# Patient Record
Sex: MALE | Race: BLACK OR AFRICAN AMERICAN | NOT HISPANIC OR LATINO | Employment: OTHER | URBAN - METROPOLITAN AREA
[De-identification: names, ages, dates, MRNs, and addresses within clinical notes are randomized per-mention and may not be internally consistent; named-entity substitution may affect disease eponyms.]

---

## 2017-02-22 ENCOUNTER — HOSPITAL ENCOUNTER (EMERGENCY)
Facility: CLINIC | Age: 22
Discharge: HOME | End: 2017-02-22
Attending: EMERGENCY MEDICINE | Admitting: EMERGENCY MEDICINE
Payer: MEDICAID

## 2017-02-22 VITALS
HEART RATE: 67 BPM | TEMPERATURE: 98.1 F | SYSTOLIC BLOOD PRESSURE: 116 MMHG | BODY MASS INDEX: 27.09 KG/M2 | RESPIRATION RATE: 22 BRPM | DIASTOLIC BLOOD PRESSURE: 78 MMHG | HEIGHT: 72 IN | OXYGEN SATURATION: 100 % | WEIGHT: 200 LBS

## 2017-02-22 DIAGNOSIS — M25.50 ARTHRALGIA, UNSPECIFIED JOINT: ICD-10-CM

## 2017-02-22 DIAGNOSIS — R00.2 PALPITATIONS: ICD-10-CM

## 2017-02-22 DIAGNOSIS — R20.2 PARESTHESIA: ICD-10-CM

## 2017-02-22 LAB
ANION GAP SERPL CALCULATED.3IONS-SCNC: 8 MMOL/L (ref 3–14)
BASOPHILS # BLD AUTO: 0 10E9/L (ref 0–0.2)
BASOPHILS NFR BLD AUTO: 0.4 %
BUN SERPL-MCNC: 12 MG/DL (ref 7–30)
CALCIUM SERPL-MCNC: 8.6 MG/DL (ref 8.5–10.1)
CHLORIDE SERPL-SCNC: 107 MMOL/L (ref 94–109)
CO2 SERPL-SCNC: 25 MMOL/L (ref 20–32)
CREAT SERPL-MCNC: 0.74 MG/DL (ref 0.66–1.25)
DIFFERENTIAL METHOD BLD: NORMAL
EOSINOPHIL # BLD AUTO: 0.3 10E9/L (ref 0–0.7)
EOSINOPHIL NFR BLD AUTO: 5.7 %
ERYTHROCYTE [DISTWIDTH] IN BLOOD BY AUTOMATED COUNT: 13 % (ref 10–15)
GFR SERPL CREATININE-BSD FRML MDRD: ABNORMAL ML/MIN/1.7M2
GLUCOSE SERPL-MCNC: 124 MG/DL (ref 70–99)
HCT VFR BLD AUTO: 43.5 % (ref 40–53)
HGB BLD-MCNC: 15.2 G/DL (ref 13.3–17.7)
IMM GRANULOCYTES # BLD: 0 10E9/L (ref 0–0.4)
IMM GRANULOCYTES NFR BLD: 0.6 %
INTERPRETATION ECG - MUSE: NORMAL
LYMPHOCYTES # BLD AUTO: 2.6 10E9/L (ref 0.8–5.3)
LYMPHOCYTES NFR BLD AUTO: 47.2 %
MAGNESIUM SERPL-MCNC: 2.2 MG/DL (ref 1.6–2.3)
MCH RBC QN AUTO: 29.3 PG (ref 26.5–33)
MCHC RBC AUTO-ENTMCNC: 34.9 G/DL (ref 31.5–36.5)
MCV RBC AUTO: 84 FL (ref 78–100)
MONOCYTES # BLD AUTO: 0.4 10E9/L (ref 0–1.3)
MONOCYTES NFR BLD AUTO: 7 %
NEUTROPHILS # BLD AUTO: 2.1 10E9/L (ref 1.6–8.3)
NEUTROPHILS NFR BLD AUTO: 39.1 %
NRBC # BLD AUTO: 0 10*3/UL
NRBC BLD AUTO-RTO: 0 /100
PLATELET # BLD AUTO: 186 10E9/L (ref 150–450)
POTASSIUM SERPL-SCNC: 3.6 MMOL/L (ref 3.4–5.3)
RBC # BLD AUTO: 5.19 10E12/L (ref 4.4–5.9)
SODIUM SERPL-SCNC: 140 MMOL/L (ref 133–144)
WBC # BLD AUTO: 5.4 10E9/L (ref 4–11)

## 2017-02-22 PROCEDURE — 99284 EMERGENCY DEPT VISIT MOD MDM: CPT | Mod: 25 | Performed by: EMERGENCY MEDICINE

## 2017-02-22 PROCEDURE — 85025 COMPLETE CBC W/AUTO DIFF WBC: CPT | Performed by: EMERGENCY MEDICINE

## 2017-02-22 PROCEDURE — 93010 ELECTROCARDIOGRAM REPORT: CPT | Mod: Z6 | Performed by: EMERGENCY MEDICINE

## 2017-02-22 PROCEDURE — 83735 ASSAY OF MAGNESIUM: CPT | Performed by: EMERGENCY MEDICINE

## 2017-02-22 PROCEDURE — 93005 ELECTROCARDIOGRAM TRACING: CPT | Performed by: EMERGENCY MEDICINE

## 2017-02-22 PROCEDURE — 80048 BASIC METABOLIC PNL TOTAL CA: CPT | Performed by: EMERGENCY MEDICINE

## 2017-02-22 RX ORDER — LIDOCAINE 40 MG/G
CREAM TOPICAL
Status: DISCONTINUED | OUTPATIENT
Start: 2017-02-22 | End: 2017-02-22 | Stop reason: HOSPADM

## 2017-02-22 ASSESSMENT — ENCOUNTER SYMPTOMS
CONFUSION: 0
EYE REDNESS: 0
NUMBNESS: 1
HEADACHES: 0
DIFFICULTY URINATING: 0
SHORTNESS OF BREATH: 0
NECK STIFFNESS: 0
COLOR CHANGE: 0
PALPITATIONS: 1
ABDOMINAL PAIN: 0
FEVER: 0
ARTHRALGIAS: 0

## 2017-02-22 NOTE — DISCHARGE INSTRUCTIONS
"  Arthralgia    Arthralgia is the term for pain in or around the joint. It is a symptom, not a disease. This pain may involve one or more joints. In some cases, the pain moves from joint to joint.  There are many causes for joint pain. These include:    Injury    Osteoarthritis (wearing out of the joint surface)    Gout (inflammation of the joint due to crystals in the joint fluid)    Infection inside the joint      Bursitis (inflammation of the fluid-filled sacs around the joint)    Autoimmune disorders such as rheumatoid arthritis or lupus    Tendonitis (inflamation of chords that attach muscle to bone)  Home care    Rest the involved joint(s) until your symptoms improve.     You may be prescribed pain medication. If none is prescribed, you may use acetaminophen or ibuprofen to control pain and inflammation.  Follow up  Follow up with your healthcare provider or our staff as advised.  When to seek medical care  Contact your healthcare provider right away if any of the following occurs:    Pain, swelling, or redness of joint increases    Pain worsens or recurs after a period of improvement    Pain moves to other joints    You cannot bear weight on the affected joint     You cannot move the affected joint    Joint appears deformed    New rash appears    Fever of 101 F (38.8 C) or higher, or as directed by your healthcare provider    1898-6520 [employer]. [address] [place] [address], [place] [address]. All rights reserved. This information is not intended as a substitute for professional medical care. Always follow your healthcare professional's instructions.            * Heart Palpitations    Palpitations refers to the feeling that your heart is beating hard, fast or irregular. Some people describe it as \"pounding\" or \"skipped beats\". Palpitations may occur in persons with heart disease, but can also occur in healthy persons. Your doctor does not believe that anything dangerous is causing your symptoms at " "this time.  Heart-Related Causes:    Arrhythmia (a change from the heart's normal rhythm)    Disease of the heart valves  Non-Heart-Related Causes:    Certain medicines (such as asthma inhalers and decongestants)    Some herbal supplements, energy drinks and pills, and weight loss pills    Illegal stimulant drugs (such as cocaine, crank, methamphetamine, PCP)    Caffeine, alcohol and tobacco    Medical conditions such as thyroid disease, anemia, anxiety and panic disorder  Sometimes the cause cannot be found.  Home Care:  1. Avoid excess caffeine, alcohol, tobacco and any stimulant drugs.  2. Tell your doctor about any prescription or over-the-counter or herbal medicines you take.  Follow Up  with your doctor or as advised by our staff.  Get Prompt Medical Attention  if any of the following occur together with palpitations:    Weakness, dizziness, light-headed or fainting    Chest pain or shortness of breath    Rapid heart rate (over 120 beats per minute, at rest)    Palpitations that lasts over 20 minutes    Weakness of an arm or leg or one side of the face    Difficulty with speech or vision    8659-1326 [employer]. [address] [place] [address]. All rights reserved. This information is not intended as a substitute for professional medical care. Always follow your healthcare professional's instructions.          Paraesthesias  Paraesthesia refers to a burning or prickling sensation that is sometimes felt in the hands, arms, legs or feet. It can also occur in other parts of the body. It can also feel like tingling or numbness, skin crawling, or itching. The feeling is not comfortable, but it is not painful. (The \"pins and needles\" feeling that happens when a foot or hand \"falls asleep\" is a temporary paraesthesia.)  Paraesthesias that last or come and go may be caused by medical issues that need to be treated. These include stroke, bulging disk (pressing on a nerve), a trapped nerve), " vitamin deficiencies, or even certain medicines.  Tests are often done. These tests may include blood tests, X-ray, CT (computerized tomography) scan, or a muscle test (electromyography). Depending on the cause, treatment may include physical therapy.  Home care    Tell the healthcare provider about all medicines you take. This includes prescription and over-the-counter medicines, vitamins, and herbs. Ask if any of the medicines may be causing your problems. Do not make any changes to prescription medicines without talking to your healthcare provider first.    You may be prescribed medicines to help relieve the tingling feeling or for pain. Take all medicines as directed.    A numb hand or foot may be more prone to injury. To help protect it:    Always use oven mitts.    Test water with an unaffected hand or foot.    Use caution when trimming nails. File sharp areas.    Wear shoes that fit well to avoid pressure points, blisters, and ulcers.    Inspect your hands and feet carefully (including the soles of your feet and between your toes) at least once a week. If you see red areas, sores, or other problems, tell your healthcare provider.  Follow-up care  Follow up with your doctor or as advised by our staff. You may need further testing or evaluation.  When to seek medical advice  Call your healthcare provider right away if any of the following occur:    Numbness or weakness of the face, one arm, or one leg    Slurred speech, confusion, trouble speaking, walking, or seeing    Severe headache, fainting spell, dizziness, or seizure    Chest, arm, neck, or upper back pain    Loss of bladder or bowel control    Open wound with redness, swelling, or pus    7139-2872 [employer]. [address] [place] [address], [place] [address]. All rights reserved. This information is not intended as a substitute for professional medical care. Always follow your healthcare professional's instructions.    Please make an appointment  to follow up with [[facility] (phone: [phone])] [as soon as possible] [ even if entirely better.]

## 2017-02-22 NOTE — LETTER
[facility], [place], EMERGENCY DEPARTMENT  [address] [facility] [address]  [phone]    2017    Van Degroot  [address]  [facility] [address]  [phone] (home)     : 1995      To Whom it may concern:    Van Degroot was seen in our Emergency Department today, 2017.   He may return to work.    Sincerely,        Amol Chen

## 2017-02-22 NOTE — ED AVS SNAPSHOT
[facility], Emergency Department    [address] [facility] [address]    Phone:  [phone]                                       Van Degroot   MRN: [id number]    Department:  [facility], Emergency Department   Date of Visit:  2/22/2017           Patient Information     Date Of Birth          1995        Your diagnoses for this visit were:     Paresthesia     Palpitations     Arthralgia, unspecified joint        You were seen by Amol Chen MD.        Discharge Instructions         Arthralgia    Arthralgia is the term for pain in or around the joint. It is a symptom, not a disease. This pain may involve one or more joints. In some cases, the pain moves from joint to joint.  There are many causes for joint pain. These include:    Injury    Osteoarthritis (wearing out of the joint surface)    Gout (inflammation of the joint due to crystals in the joint fluid)    Infection inside the joint      Bursitis (inflammation of the fluid-filled sacs around the joint)    Autoimmune disorders such as rheumatoid arthritis or lupus    Tendonitis (inflamation of chords that attach muscle to bone)  Home care    Rest the involved joint(s) until your symptoms improve.     You may be prescribed pain medication. If none is prescribed, you may use acetaminophen or ibuprofen to control pain and inflammation.  Follow up  Follow up with your healthcare provider or our staff as advised.  When to seek medical care  Contact your healthcare provider right away if any of the following occurs:    Pain, swelling, or redness of joint increases    Pain worsens or recurs after a period of improvement    Pain moves to other joints    You cannot bear weight on the affected joint     You cannot move the affected joint    Joint appears deformed    New rash appears    Fever of 101 F (38.8 C) or higher, or as directed by your healthcare provider    4036-2596 [employer]. [address] [place] [address].  "All rights reserved. This information is not intended as a substitute for professional medical care. Always follow your healthcare professional's instructions.            * Heart Palpitations    Palpitations refers to the feeling that your heart is beating hard, fast or irregular. Some people describe it as \"pounding\" or \"skipped beats\". Palpitations may occur in persons with heart disease, but can also occur in healthy persons. Your doctor does not believe that anything dangerous is causing your symptoms at this time.  Heart-Related Causes:    Arrhythmia (a change from the heart's normal rhythm)    Disease of the heart valves  Non-Heart-Related Causes:    Certain medicines (such as asthma inhalers and decongestants)    Some herbal supplements, energy drinks and pills, and weight loss pills    Illegal stimulant drugs (such as cocaine, crank, methamphetamine, PCP)    Caffeine, alcohol and tobacco    Medical conditions such as thyroid disease, anemia, anxiety and panic disorder  Sometimes the cause cannot be found.  Home Care:  1. Avoid excess caffeine, alcohol, tobacco and any stimulant drugs.  2. Tell your doctor about any prescription or over-the-counter or herbal medicines you take.  Follow Up  with your doctor or as advised by our staff.  Get Prompt Medical Attention  if any of the following occur together with palpitations:    Weakness, dizziness, light-headed or fainting    Chest pain or shortness of breath    Rapid heart rate (over 120 beats per minute, at rest)    Palpitations that lasts over 20 minutes    Weakness of an arm or leg or one side of the face    Difficulty with speech or vision    4683-7813 [employer]. [address] [place] [address]. All rights reserved. This information is not intended as a substitute for professional medical care. Always follow your healthcare professional's instructions.          Paraesthesias  Paraesthesia refers to a burning or prickling sensation " "that is sometimes felt in the hands, arms, legs or feet. It can also occur in other parts of the body. It can also feel like tingling or numbness, skin crawling, or itching. The feeling is not comfortable, but it is not painful. (The \"pins and needles\" feeling that happens when a foot or hand \"falls asleep\" is a temporary paraesthesia.)  Paraesthesias that last or come and go may be caused by medical issues that need to be treated. These include stroke, bulging disk (pressing on a nerve), a trapped nerve), vitamin deficiencies, or even certain medicines.  Tests are often done. These tests may include blood tests, X-ray, CT (computerized tomography) scan, or a muscle test (electromyography). Depending on the cause, treatment may include physical therapy.  Home care    Tell the healthcare provider about all medicines you take. This includes prescription and over-the-counter medicines, vitamins, and herbs. Ask if any of the medicines may be causing your problems. Do not make any changes to prescription medicines without talking to your healthcare provider first.    You may be prescribed medicines to help relieve the tingling feeling or for pain. Take all medicines as directed.    A numb hand or foot may be more prone to injury. To help protect it:    Always use oven mitts.    Test water with an unaffected hand or foot.    Use caution when trimming nails. File sharp areas.    Wear shoes that fit well to avoid pressure points, blisters, and ulcers.    Inspect your hands and feet carefully (including the soles of your feet and between your toes) at least once a week. If you see red areas, sores, or other problems, tell your healthcare provider.  Follow-up care  Follow up with your doctor or as advised by our staff. You may need further testing or evaluation.  When to seek medical advice  Call your healthcare provider right away if any of the following occur:    Numbness or weakness of the face, one arm, or one " leg    Slurred speech, confusion, trouble speaking, walking, or seeing    Severe headache, fainting spell, dizziness, or seizure    Chest, arm, neck, or upper back pain    Loss of bladder or bowel control    Open wound with redness, swelling, or pus    2231-3965 [employer]. [address] [place] [address]. All rights reserved. This information is not intended as a substitute for professional medical care. Always follow your healthcare professional's instructions.    Please make an appointment to follow up with [[facility] (phone: [phone])] [as soon as possible] [ even if entirely better.]        24 Hour Appointment Hotline       To make an appointment at any [facility], call [phone] ([phone]). If you don't have a family doctor or clinic, we will help you find one. [facility] clinics are conveniently located to serve the needs of you and your family.             Review of your medicines      Notice     You have not been prescribed any medications.            Procedures and tests performed during your visit     Basic metabolic panel    CBC with platelets differential    EKG 12-lead, tracing only    Magnesium    Peripheral IV catheter      Orders Needing Specimen Collection     None      Pending Results     Date and Time Order Name Status Description    2/22/2017 0630 EKG 12-lead, tracing only Preliminary             Pending Culture Results     No orders found from 2/20/2017 to 2/23/2017.            Thank you for choosing [facility]       Thank you for choosing [facility] for your care. Our goal is always to provide you with excellent care. Hearing back from our patients is one way we can continue to improve our services. Please take a few minutes to complete the written survey that you may receive in the mail after you visit with us. Thank you!        [employer] Information     [employer] lets you send messages to your doctor, view your test results, renew  "your prescriptions, schedule appointments and more. To sign up, go to [email or web address] . Click on \"Log in\" on the left side of the screen, which will take you to the Welcome page. Then click on \"Sign up Now\" on the right side of the page.     You will be asked to enter the access code listed below, as well as some personal information. Please follow the directions to create your username and password.     Your access code is: [email or web address]  Expires: 2017  8:09 AM     Your access code will  in 90 days. If you need help or a new code, please call your [facility] clinic or [phone].        Care EveryWhere ID     This is your Care EveryWhere ID. This could be used by other organizations to access your [facility] medical records  [id number]        After Visit Summary       This is your record. Keep this with you and show to your community pharmacist(s) and doctor(s) at your next visit.                  "

## 2017-02-22 NOTE — ED NOTES
"23 yo male arrives via triage c/o \"I don't feel well\". Pt describes it as his heart feels heavy and has to taking lyle breaths. Pt is aox4 on arrival and talking in complete sentences. Pt denies any cardiac or pulmonary history and takes no medications on a daily basis.   "

## 2017-02-22 NOTE — ED AVS SNAPSHOT
[facility], [place], Emergency Department    [address] [place] [address]    Phone:  [phone]                                       Van Degroot   MRN: [id number]    Department:  [facility], Emergency Department   Date of Visit:  2/22/2017           After Visit Summary Signature Page     I have received my discharge instructions, and my questions have been answered. I have discussed any challenges I see with this plan with the nurse or doctor.    ..........................................................................................................................................  Patient/Patient Representative Signature      ..........................................................................................................................................  Patient Representative Print Name and Relationship to Patient    ..................................................               ................................................  Date                                            Time    ..........................................................................................................................................  Reviewed by Signature/Title    ...................................................              ..............................................  Date                                                            Time

## 2017-02-22 NOTE — ED PROVIDER NOTES
"  History     Chief Complaint   Patient presents with     Shortness of Breath     Irregular Heart Beat     Dizziness     HPI  Van Degroot is a 22 year old male who presents with a number of complaints including intermittent episodes of bilateral hand and foot paresthesias, palpitations, feeling like he needs to take lyle breaths, joint aches, as well as feeling like he may have an illness.  He states that he is here for  \"a checkup\".  He states that he has had these symptoms off and on dating back years and has seen a physician and was told that he needs to get regular exercise.  He notes that he is most concerned that he may have diabetes.  He apparently had a relative with diabetes with similar symptoms.    I have reviewed the Medications, Allergies, Past Medical and Surgical History, and Social History in the Epic system.    Review of Systems   Constitutional: Negative for fever.   HENT: Negative for congestion.    Eyes: Negative for redness.   Respiratory: Negative for shortness of breath.    Cardiovascular: Positive for palpitations. Negative for chest pain.   Gastrointestinal: Negative for abdominal pain.   Genitourinary: Negative for difficulty urinating.   Musculoskeletal: Negative for arthralgias and neck stiffness.   Skin: Negative for color change.   Neurological: Positive for numbness (intermittent paresthesias of bilateral hands and feet). Negative for headaches.   Psychiatric/Behavioral: Negative for confusion.       Physical Exam   BP: 131/83  Pulse: 67  Temp: 98.1  F (36.7  C)  Resp: 18  Height: 182.9 cm (6')  Weight: 90.7 kg (200 lb)  SpO2: 100 %  Physical Exam   Constitutional: No distress.   HENT:   Head: Atraumatic.   Mouth/Throat: Oropharynx is clear and moist. No oropharyngeal exudate.   Eyes: Pupils are equal, round, and reactive to light. No scleral icterus.   Cardiovascular: Normal heart sounds and intact distal pulses.    Pulmonary/Chest: Breath sounds normal. No respiratory " distress.   Abdominal: Soft. Bowel sounds are normal. There is no tenderness.   Musculoskeletal: He exhibits no edema or tenderness.   Skin: Skin is warm. No rash noted. He is not diaphoretic.       ED Course     ED Course     Procedures             EKG Interpretation:      Interpreted by Amol Chen  Time reviewed: 7 AM  Symptoms at time of EKG: None   Rhythm: normal sinus   Rate: normal  Axis: normal  Ectopy: none  Conduction: normal  ST Segments/ T Waves: No ST-T wave changes  Q Waves: none  Comparison to prior: No old EKG available    Clinical Impression: normal EKG          Critical Care time:  none               Labs Ordered and Resulted from Time of ED Arrival Up to the Time of Departure from the ED   BASIC METABOLIC PANEL - Abnormal; Notable for the following:        Result Value    Glucose 124 (*)     All other components within normal limits   CBC WITH PLATELETS DIFFERENTIAL   MAGNESIUM   PERIPHERAL IV CATHETER       Assessments & Plan (with Medical Decision Making)   22-year-old otherwise healthy male presents with a number of complaints and concerns regarding his overall health.  He is here requesting a medical checkup.  His exam is entirely unremarkable.  EKG is normal and CBC as well as basic metabolic panel are normal with exception of a slightly elevated glucose.  It appears that anxiety is playing a role in some of his symptoms.  I recommended follow-up with the primary care physician to address his complaints going forward.  He was provided with name and number of primary care services.      I have reviewed the nursing notes.    I have reviewed the findings, diagnosis, plan and need for follow up with the patient.    New Prescriptions    No medications on file       Final diagnoses:   Paresthesia   Palpitations   Arthralgia, unspecified joint       2/22/2017   [facility], [place], EMERGENCY DEPARTMENT     Amol Chen MD  02/22/17 0751

## 2018-02-02 ENCOUNTER — OFFICE VISIT - HEALTHEAST (OUTPATIENT)
Dept: FAMILY MEDICINE | Facility: CLINIC | Age: 23
End: 2018-02-02

## 2018-02-02 DIAGNOSIS — G89.29 CHRONIC RIGHT SHOULDER PAIN: ICD-10-CM

## 2018-02-02 DIAGNOSIS — R10.9 ABDOMINAL PAIN: ICD-10-CM

## 2018-02-02 DIAGNOSIS — M25.511 CHRONIC RIGHT SHOULDER PAIN: ICD-10-CM

## 2018-02-02 DIAGNOSIS — M72.2 PLANTAR FASCIITIS OF RIGHT FOOT: ICD-10-CM

## 2018-02-02 LAB
ALBUMIN SERPL-MCNC: 3.9 G/DL (ref 3.5–5)
ALP SERPL-CCNC: 89 U/L (ref 45–120)
ALT SERPL W P-5'-P-CCNC: 49 U/L (ref 0–45)
ANION GAP SERPL CALCULATED.3IONS-SCNC: 9 MMOL/L (ref 5–18)
AST SERPL W P-5'-P-CCNC: 26 U/L (ref 0–40)
BILIRUB SERPL-MCNC: 0.5 MG/DL (ref 0–1)
BUN SERPL-MCNC: 12 MG/DL (ref 8–22)
CALCIUM SERPL-MCNC: 9.1 MG/DL (ref 8.5–10.5)
CHLORIDE BLD-SCNC: 106 MMOL/L (ref 98–107)
CO2 SERPL-SCNC: 25 MMOL/L (ref 22–31)
CREAT SERPL-MCNC: 0.78 MG/DL (ref 0.7–1.3)
ERYTHROCYTE [DISTWIDTH] IN BLOOD BY AUTOMATED COUNT: 13.1 % (ref 11–14.5)
GFR SERPL CREATININE-BSD FRML MDRD: >60 ML/MIN/1.73M2
GLUCOSE BLD-MCNC: 97 MG/DL (ref 70–125)
HCT VFR BLD AUTO: 47 % (ref 40–54)
HGB BLD-MCNC: 15.6 G/DL (ref 14–18)
MCH RBC QN AUTO: 28.3 PG (ref 27–34)
MCHC RBC AUTO-ENTMCNC: 33.2 G/DL (ref 32–36)
MCV RBC AUTO: 85 FL (ref 80–100)
PLATELET # BLD AUTO: 184 THOU/UL (ref 140–440)
PMV BLD AUTO: 9.9 FL (ref 7–10)
POTASSIUM BLD-SCNC: 3.9 MMOL/L (ref 3.5–5)
PROT SERPL-MCNC: 7.8 G/DL (ref 6–8)
RBC # BLD AUTO: 5.51 MILL/UL (ref 4.4–6.2)
SODIUM SERPL-SCNC: 140 MMOL/L (ref 136–145)
WBC: 6.1 THOU/UL (ref 4–11)

## 2018-02-02 ASSESSMENT — MIFFLIN-ST. JEOR: SCORE: 1975.48

## 2018-02-05 ENCOUNTER — COMMUNICATION - HEALTHEAST (OUTPATIENT)
Dept: FAMILY MEDICINE | Facility: CLINIC | Age: 23
End: 2018-02-05

## 2018-05-04 ENCOUNTER — OFFICE VISIT - HEALTHEAST (OUTPATIENT)
Dept: FAMILY MEDICINE | Facility: CLINIC | Age: 23
End: 2018-05-04

## 2018-05-04 ENCOUNTER — RECORDS - HEALTHEAST (OUTPATIENT)
Dept: GENERAL RADIOLOGY | Facility: CLINIC | Age: 23
End: 2018-05-04

## 2018-05-04 DIAGNOSIS — R10.31 RLQ ABDOMINAL PAIN: ICD-10-CM

## 2018-05-04 DIAGNOSIS — Z00.00 ROUTINE HEALTH MAINTENANCE: ICD-10-CM

## 2018-05-04 DIAGNOSIS — Z13.220 SCREENING FOR LIPID DISORDERS: ICD-10-CM

## 2018-05-04 DIAGNOSIS — R10.31 RIGHT LOWER QUADRANT PAIN: ICD-10-CM

## 2018-05-04 LAB
CHOLEST SERPL-MCNC: 157 MG/DL
FASTING STATUS PATIENT QL REPORTED: YES
HDLC SERPL-MCNC: 42 MG/DL
LDLC SERPL CALC-MCNC: 82 MG/DL
TRIGL SERPL-MCNC: 167 MG/DL

## 2018-05-04 ASSESSMENT — MIFFLIN-ST. JEOR: SCORE: 2001.62

## 2018-05-30 ENCOUNTER — OFFICE VISIT - HEALTHEAST (OUTPATIENT)
Dept: FAMILY MEDICINE | Facility: CLINIC | Age: 23
End: 2018-05-30

## 2018-05-30 DIAGNOSIS — J30.9 ALLERGIC RHINITIS: ICD-10-CM

## 2018-05-30 DIAGNOSIS — R23.3 PALATAL PETECHIAE: ICD-10-CM

## 2018-05-30 DIAGNOSIS — J02.0 STREP PHARYNGITIS: ICD-10-CM

## 2018-05-30 LAB — DEPRECATED S PYO AG THROAT QL EIA: ABNORMAL

## 2018-09-25 ENCOUNTER — OFFICE VISIT - HEALTHEAST (OUTPATIENT)
Dept: FAMILY MEDICINE | Facility: CLINIC | Age: 23
End: 2018-09-25

## 2018-09-25 DIAGNOSIS — R10.31 RLQ ABDOMINAL PAIN: ICD-10-CM

## 2018-09-25 DIAGNOSIS — M79.671 HEEL PAIN, BILATERAL: ICD-10-CM

## 2018-09-25 DIAGNOSIS — M79.672 HEEL PAIN, BILATERAL: ICD-10-CM

## 2018-12-28 ENCOUNTER — OFFICE VISIT - HEALTHEAST (OUTPATIENT)
Dept: FAMILY MEDICINE | Facility: CLINIC | Age: 23
End: 2018-12-28

## 2018-12-28 ENCOUNTER — RECORDS - HEALTHEAST (OUTPATIENT)
Dept: GENERAL RADIOLOGY | Facility: CLINIC | Age: 23
End: 2018-12-28

## 2018-12-28 ENCOUNTER — HOSPITAL ENCOUNTER (OUTPATIENT)
Dept: CT IMAGING | Facility: CLINIC | Age: 23
Discharge: HOME | End: 2018-12-28

## 2018-12-28 DIAGNOSIS — R10.31 RLQ ABDOMINAL PAIN: ICD-10-CM

## 2018-12-28 DIAGNOSIS — R07.89 ATYPICAL CHEST PAIN: ICD-10-CM

## 2018-12-28 DIAGNOSIS — R10.31 RIGHT LOWER QUADRANT PAIN: ICD-10-CM

## 2018-12-29 LAB
ATRIAL RATE - MUSE: 58 BPM
DIASTOLIC BLOOD PRESSURE - MUSE: NORMAL MMHG
INTERPRETATION ECG - MUSE: NORMAL
P AXIS - MUSE: 26 DEGREES
PR INTERVAL - MUSE: 152 MS
QRS DURATION - MUSE: 84 MS
QT - MUSE: 382 MS
QTC - MUSE: 374 MS
R AXIS - MUSE: 14 DEGREES
SYSTOLIC BLOOD PRESSURE - MUSE: NORMAL MMHG
T AXIS - MUSE: 25 DEGREES
VENTRICULAR RATE- MUSE: 58 BPM

## 2019-05-17 ENCOUNTER — OFFICE VISIT - HEALTHEAST (OUTPATIENT)
Dept: FAMILY MEDICINE | Facility: CLINIC | Age: 24
End: 2019-05-17

## 2019-05-17 DIAGNOSIS — M25.562 LEFT KNEE PAIN, UNSPECIFIED CHRONICITY: ICD-10-CM

## 2019-05-17 DIAGNOSIS — J30.2 SEASONAL ALLERGIC RHINITIS, UNSPECIFIED TRIGGER: ICD-10-CM

## 2019-07-08 ENCOUNTER — OFFICE VISIT - HEALTHEAST (OUTPATIENT)
Dept: FAMILY MEDICINE | Facility: CLINIC | Age: 24
End: 2019-07-08

## 2019-07-08 DIAGNOSIS — M25.562 CHRONIC PAIN OF LEFT KNEE: ICD-10-CM

## 2019-07-08 DIAGNOSIS — G89.29 CHRONIC PAIN OF LEFT KNEE: ICD-10-CM

## 2019-10-10 ENCOUNTER — OFFICE VISIT - HEALTHEAST (OUTPATIENT)
Dept: FAMILY MEDICINE | Facility: CLINIC | Age: 24
End: 2019-10-10

## 2019-10-10 ENCOUNTER — HOSPITAL ENCOUNTER (OUTPATIENT)
Dept: RADIOLOGY | Facility: CLINIC | Age: 24
Discharge: HOME | End: 2019-10-10

## 2019-10-10 DIAGNOSIS — E87.6 HYPOKALEMIA: ICD-10-CM

## 2019-10-10 DIAGNOSIS — R06.02 SHORTNESS OF BREATH: ICD-10-CM

## 2019-10-10 DIAGNOSIS — R05.9 COUGH: ICD-10-CM

## 2019-10-10 LAB
ALBUMIN UR-MCNC: NEGATIVE MG/DL
ANION GAP SERPL CALCULATED.3IONS-SCNC: 12 MMOL/L (ref 5–18)
APPEARANCE UR: CLEAR
BASOPHILS # BLD AUTO: 0 THOU/UL (ref 0–0.2)
BASOPHILS NFR BLD AUTO: 0 % (ref 0–2)
BILIRUB UR QL STRIP: NEGATIVE
BUN SERPL-MCNC: 5 MG/DL (ref 8–22)
CHLORIDE BLD-SCNC: 101 MMOL/L (ref 98–107)
CO2 SERPL-SCNC: 26 MMOL/L (ref 22–31)
COLOR UR AUTO: YELLOW
CREAT SERPL-MCNC: 0.7 MG/DL (ref 0.8–1.5)
EOSINOPHIL # BLD AUTO: 0.2 THOU/UL (ref 0–0.4)
EOSINOPHIL NFR BLD AUTO: 3 % (ref 0–6)
ERYTHROCYTE [DISTWIDTH] IN BLOOD BY AUTOMATED COUNT: 13.1 % (ref 11–14.5)
GLUCOSE BLD-MCNC: 139 MG/DL (ref 70–125)
GLUCOSE UR STRIP-MCNC: NEGATIVE MG/DL
HCT VFR BLD AUTO: 44.5 % (ref 40–54)
HGB BLD-MCNC: 15.3 G/DL (ref 14–18)
HGB UR QL STRIP: NEGATIVE
KETONES UR STRIP-MCNC: NEGATIVE MG/DL
LEUKOCYTE ESTERASE UR QL STRIP: NEGATIVE
LYMPHOCYTES # BLD AUTO: 1.8 THOU/UL (ref 0.8–4.4)
LYMPHOCYTES NFR BLD AUTO: 33 % (ref 20–40)
MCH RBC QN AUTO: 29.9 PG (ref 27–34)
MCHC RBC AUTO-ENTMCNC: 34.4 G/DL (ref 32–36)
MCV RBC AUTO: 87 FL (ref 80–100)
MONOCYTES # BLD AUTO: 0.5 THOU/UL (ref 0–0.9)
MONOCYTES NFR BLD AUTO: 9 % (ref 2–10)
NEUTROPHILS # BLD AUTO: 2.9 THOU/UL (ref 2–7.7)
NEUTROPHILS NFR BLD AUTO: 55 % (ref 50–70)
NITRATE UR QL: NEGATIVE
PH UR STRIP: 7 [PH] (ref 5–8)
PLATELET # BLD AUTO: 189 THOU/UL (ref 140–440)
PMV BLD AUTO: 9.6 FL (ref 7–10)
POTASSIUM BLD-SCNC: 3.4 MMOL/L (ref 3.5–5.5)
RBC # BLD AUTO: 5.13 MILL/UL (ref 4.4–6.2)
SODIUM SERPL-SCNC: 139 MMOL/L (ref 136–145)
SP GR UR STRIP: 1.02 (ref 1–1.03)
UROBILINOGEN UR STRIP-ACNC: NORMAL
WBC: 5.3 THOU/UL (ref 4–11)

## 2020-03-26 ENCOUNTER — COMMUNICATION - HEALTHEAST (OUTPATIENT)
Dept: FAMILY MEDICINE | Facility: CLINIC | Age: 25
End: 2020-03-26

## 2021-03-30 ENCOUNTER — COMMUNICATION - HEALTHEAST (OUTPATIENT)
Dept: ADMINISTRATIVE | Facility: CLINIC | Age: 26
End: 2021-03-30

## 2021-03-30 DIAGNOSIS — Z00.00 ROUTINE HEALTH MAINTENANCE: ICD-10-CM

## 2021-05-28 NOTE — PROGRESS NOTES
Assessment/Plan:     1. Seasonal allergic rhinitis, unspecified trigger  Start daily antihistamine.  Zyrtec once daily.  - cetirizine (ZYRTEC) 10 MG tablet; Take 1 tablet (10 mg total) by mouth daily.  Dispense: 30 tablet; Refill: 2    2. Left knee pain, unspecified chronicity  No swelling or instability.  Possible overuse versus meniscal injury.  Recommend rest, ice, Ibuprofen as needed.  Refer to PT.   - Ambulatory referral to Adult PT- Internal        Subjective:     Van Degroot is a 24 y.o. male who presents with complains of a nonproductive cough, runny nose, eye itching, and sneezing for the past 1.5 weeks.  He feels intermittently congested.  No fever, wheezing, sore throat, or shortness of breath.  Does not historically get seasonal allergies.  No OTC treatments tried.    Patient also complains of left knee pain for the last 3 months.  Symptoms have gotten worse.  Reports left, lateral knee pain.  It really only hurts when he stands up after sitting for a long period of time.  No popping, locking, or clicking of the knee.  Denies any redness or swelling.  After he walks for a period of time, the pain will completely resolve.  He plays soccer.  No history of knee surgeries or injuries.  No treatments tried at home other than rest.       The following portions of the patient's history were reviewed and updated as appropriate: allergies, current medications, past family history, past medical history, past social history, past surgical history and problem list.    Review of Systems  A comprehensive review of systems was performed and was otherwise negative    Objective:     /70   Pulse 65   Temp 98.7  F (37.1  C)   Wt 215 lb 3.2 oz (97.6 kg)   SpO2 98%   BMI 28.79 kg/m      General Appearance: Alert, cooperative, no distress, appears stated age  Eyes: PERRL, conjunctiva/corneas clear, EOM's intact  Ears: Normal TM's and external ear canals, both ears  Nose: Nares normal, septum midline, mucosa  normal, no drainage  Throat: Lips, mucosa, and tongue normal; teeth and gums normal  Neck: Supple, symmetrical, trachea midline, no adenopathy  Lungs: Clear to auscultation bilaterally, respirations unlabored  Heart: Regular rate and rhythm, S1 and S2 normal, no murmur, rub, or gallop  Extremities: Left knee appears normal without redness, swelling, or acute deformity. No laxity noted. Normal flexion and extension without clicking or locking. Tenderness palpated over the lateral joint line.     Amalia Ambrosio, NP

## 2021-05-30 NOTE — PROGRESS NOTES
Subjective:   Van Degroot is a(n) 24 y.o. Black or  male who presents to [facility] with the following complaint(s):  Knee Pain (Left knee pain x 3-4 months)    History of Present Illness:  Patient presents with knee pain for 3-4 months. Occurred starting after a fall during soccer. Pain is located on the left lateral knee. He notes the pain when crossing his legs, if he was sitting for a while and stands he has pain for a few steps. He is still able to play soccer, but sometimes stops early. Not swollen. Has not tried a knee sleeve. Did not treat ti in any way when it first occurred.   He also notes he started personal training at the gym.    The following portions of the patient's history were reviewed and updated as appropriate: allergies, current medications, past medical history and problem list.    Review of Systems:   Patient feeling well without fevers, chills, skin rash or changes of the skin over his left knee, swelling in his ankles.  Objective:     Vitals:    07/08/19 1214   BP: 111/73   Patient Site: Right Arm   Patient Position: Sitting   Cuff Size: Adult Large   Pulse: 74   Resp: 18   Temp: 98.4  F (36.9  C)   TempSrc: Oral   SpO2: 99%   Weight: 217 lb (98.4 kg)     General: No acute distress  Skin: Left knee is not erythematous, no lesions  Musculoskeletal: Knees are symmetric to inspection without effusion seen, bilateral lower extremities with 5 out of 5 strength for all movements, patient with mild tenderness to palpation of lateral left knee over the joint line.  Possible very mild swelling of this area compared to the right.  Patient able to bear weight and ambulate without a limp.  Intact passive and active range of motion.  No crepitus felt, no tenderness over the patella.  Reflexes are symmetric at bilateral patellar tendons.  There is no edema of lower externally's bilaterally.    Assessment/Plan   1. Chronic pain of left knee.  Patient fell during soccer 4 months ago  and has had left knee pain ever since.  He has always been able to bear weight and his passive and active range of motion are intact.  There is no major effusion seen, patient has tenderness over the lateral joint line with potential mild soft tissue swelling over the fibular head but looks symmetric. This is likely a traumatic bursitis followed by overuse as he never took a break from soccer and has started personal training at the gym.  We discussed today relative rest and icing of the area to help with pain control as well as over-the-counter pain medications.  I recommend physical therapy for resolution of the pain, and patient was interested in this.  The pain has been going on for 4 months and patient's activities are not hindered I do not see any need for immediate imaging.  I do not suspect septic joint.  - Ambulatory referral to PT/OT  - Counseled  regarding assessment and plan for evaluation and treatment. Questions were answered. See AVS for the specific written instructions and educational handout(s) that were provided at the conclusion of the visit.   - Discussed signs / symptoms that warrant urgent / emergent medical attention.   - Follow up with PCP if symptoms worsen or fail to resolve.    Lizett Vu MD

## 2021-05-31 VITALS — HEIGHT: 72 IN | BODY MASS INDEX: 28.74 KG/M2 | WEIGHT: 212.19 LBS

## 2021-06-01 VITALS — BODY MASS INDEX: 28.76 KG/M2 | WEIGHT: 215 LBS

## 2021-06-01 VITALS — BODY MASS INDEX: 28.65 KG/M2 | HEIGHT: 73 IN | WEIGHT: 216.2 LBS

## 2021-06-02 VITALS — BODY MASS INDEX: 28.49 KG/M2 | WEIGHT: 213 LBS

## 2021-06-02 VITALS — WEIGHT: 216.7 LBS | BODY MASS INDEX: 28.99 KG/M2

## 2021-06-03 VITALS — BODY MASS INDEX: 29.03 KG/M2 | WEIGHT: 217 LBS

## 2021-06-03 VITALS
RESPIRATION RATE: 18 BRPM | BODY MASS INDEX: 28.93 KG/M2 | DIASTOLIC BLOOD PRESSURE: 74 MMHG | WEIGHT: 216.3 LBS | OXYGEN SATURATION: 100 % | HEART RATE: 76 BPM | SYSTOLIC BLOOD PRESSURE: 118 MMHG | TEMPERATURE: 98.2 F

## 2021-06-03 VITALS — WEIGHT: 215.2 LBS | BODY MASS INDEX: 28.79 KG/M2

## 2021-06-07 NOTE — TELEPHONE ENCOUNTER
Place have Pt reschedule appointment out till July since we are trying to not have people come to clinic due to COVID-19.

## 2021-06-15 PROBLEM — S46.911A RIGHT SHOULDER STRAIN, INITIAL ENCOUNTER: Status: ACTIVE | Noted: 2017-01-21
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
## 2021-06-16 PROBLEM — G89.29 CHRONIC RIGHT SHOULDER PAIN: Status: ACTIVE | Noted: 2017-04-10

## 2021-06-16 PROBLEM — R74.01 ELEVATED ALANINE AMINOTRANSFERASE (ALT) LEVEL: Status: ACTIVE | Noted: 2017-05-01

## 2021-06-16 PROBLEM — M25.511 CHRONIC RIGHT SHOULDER PAIN: Status: ACTIVE | Noted: 2017-04-10

## 2021-06-16 PROBLEM — E55.9 VITAMIN D DEFICIENCY: Status: ACTIVE | Noted: 2017-04-10

## 2021-06-16 NOTE — TELEPHONE ENCOUNTER
Reason for Call:  Other     Detailed comments: Pt is in [place] trying to see a provider at [facility] because he is not feeling well, pt had appt at 830 am but they wont see patient unless they get a referral/order from provider because the patient has [place] [employer]..   Please fax the order to [phone], P- [phone].   Once this has been faxed please call patient so they can make another appt.     Phone Number Patient can be reached at: Home number on file [phone] (home)    Best Time: anytime    Can we leave a detailed message on this number?: Yes    Call taken on 3/30/2021 at 9:25 AM by Joanne Scwharz

## 2021-06-16 NOTE — TELEPHONE ENCOUNTER
Family practice referral will work they stated, He just would like a physical out there and talk about some concerns

## 2021-06-17 NOTE — PATIENT INSTRUCTIONS - HE
Patient Instructions by Amalia Ambrosio NP at 5/17/2019  3:20 PM     Author: Amalia Ambrosio NP Service: -- Author Type: Nurse Practitioner    Filed: 5/17/2019  3:31 PM Encounter Date: 5/17/2019 Status: Signed    : Amalia Ambrosio NP (Nurse Practitioner)       Patient Education     Knee Pain with Uncertain Cause    There are several common causes for knee pain. These can include:    A sprain of the ligaments that support the joint    An injury to the cartilage lining of the joint    Arthritis from wear-and-tear or inflammation  There are other causes as well. There may also be swelling, reduced movement of the knee joint, and pain with walking. A definite diagnosis will still need to be made. If your symptoms do not improve, further follow-up and testing may be needed.  Home care    Stay off the injured leg as much as possible until pain improves.    Apply an ice pack over the injured area for 15 to 20 minutes every 3 to 6 hours. You should do this for the first 24 to 48 hours. You can make an ice pack by filling a plastic bag that seals at the top with ice cubes and then wrapping it with a thin towel. Continue to use ice packs for relief of pain and swelling as needed. As the ice melts, be careful to avoid getting your wrap, splint, or cast wet. After 48 hours, apply heat (warm shower or warm bath) for 15 to 20 minutes several times a day, or alternate ice and heat. If you have to wear a hook-and-loop knee brace, you can open it to apply the ice pack, or heat, directly to the knee. Never put ice directly on the skin. Always wrap the ice in a towel or other type of cloth.    You may use over-the-counter pain medicine to control pain, unless another pain medicine was prescribed. If you have chronic liver or kidney disease or ever had a stomach ulcer or GI bleeding, talk with your healthcare provider using these medicines.    If crutches or a walker have been recommended, do not put weight on the injured  leg until you can do so without pain. Check with your healthcare provider before returning to sports or full work duties.    If you have a hook-and-loop knee brace, you can remove it to bathe and sleep, unless told otherwise.  Follow-up care  Follow up with your healthcare provider as advised. This is usually within 1 to 2 weeks.  If X-rays were taken, you will be told of any new findings that may affect your care.  Call 911  Call 911 if you have:    Shortness of breath    Chest pain  When to seek medical advice  Call your healthcare provider right away if any of these occur:    Toes or foot becomes swollen, cold, blue, numb, or tingly    Pain or swelling spreads over the knee or calf    Warmth or redness appears over the knee or calf    Other joints become painful    Rash appears    Fever of 100.4 F (38 C) or higher, or as directed by your healthcare provider  Date Last Reviewed: 11/23/2015 2000-2017 [employer]. [address] [place] [address], [place] [address]. All rights reserved. This information is not intended as a substitute for professional medical care. Always follow your healthcare professional's instructions.

## 2021-06-17 NOTE — PATIENT INSTRUCTIONS - HE
Patient Instructions by Lizett Meek MD at 7/8/2019 12:10 PM     Author: Lizett Meek MD Service: -- Author Type: Resident    Filed: 7/8/2019 12:35 PM Encounter Date: 7/8/2019 Status: Signed    : Lizett Meek MD (Resident)       Patient Education     Knee Pain  Knee pain is very common. Its especially common in active people who put a lot of pressure on their knees, like runners. It affects women more often than men.  Your kneecap (patella) is a thick, round bone. It covers and protects the front portion of your knee joint. It moves along a groove in your thighbone (femur) as part of the patellofemoral joint. A layer of cartilage surrounds the underside of your kneecap. This layer protects it from grinding against your femur.  When this cartilage softens and breaks down, it can cause knee pain. This is partly because of repetitive stress. The stress irritates the lining of the joint. This causes pain in the underlying bone.  What causes knee pain?  Many things can cause knee pain. You may have more than one cause. Some of these include:    Overuse of the knee joint    The kneecap doesnt line up with the tissue around it    Damage to small nerves in the area    Damage to the ligament-like structure that holds the kneecap in place (retinaculum)    Breakdown of the bone under the cartilage    Swelling in the soft tissues around the kneecap    Injury  You might be more likely to have knee pain if you:    Exercise a lot    Recently increased the intensity of your workouts    Have a body mass index (BMI) greater than 25    Have poor alignment of your kneecap    Walk with your feet turned overly outward or inward    Have weakness in surrounding muscle groups (inner quad or hip adductor muscles)    Have too much tightness in surrounding muscle groups (hamstrings or iliotibial band)    Have a recent history of injury to the area    Are  female  Symptoms of knee pain  This type of knee pain is a dull, aching pain in the front of the knee in the area under and around the kneecap. This pain may start quickly or slowly. Your pain might be worse when you squat, run, or sit for a long time. You might also sometimes feel like your knee is giving out. You may have symptoms in one or both of your knees.  Diagnosing knee pain  Your healthcare provider will ask about your medical history and your symptoms. Be sure to describe any activities that make your knee pain worse. He or she will look at your knee. This will include tests of your range of motion, strength, and areas of pain of your knee. Your knee alignment will be checked.  Your healthcare provider will need to rule out other causes of your knee pain, such as arthritis. You may need an imaging test, such as an X-ray or MRI.  Treatment for knee pain  Treatments that can help ease your symptoms may include:    Avoiding activities for a while that make your pain worse, returning to activity over time    Icing the outside of your knee when it causes you pain    Taking over-the-counter pain medicine    Wearing a knee brace or taping your knee to support it    Wearing special shoe inserts to help keep your feet in the proper alignment    Doing special exercises to stretch and strengthen the muscles around your hip and your knee  These steps help most people manage knee pain. But some cases of knee pain need to be treated with surgery. You may need surgery right away. Or you may need it later if other treatments dont work. Your healthcare provider may refer you to an orthopedic surgeon. He or she will talk with you about your choices.  Preventing knee pain  Losing weight and correcting excess muscle tightness or muscle weakness may help lower your risk.  In some cases, you can prevent knee pain. To help prevent a flare-up of knee pain, you do these things:    Regularly do all the exercises your doctor or  physical therapist advises    Support your knee as advised by your doctor or physical therapist    Increase training gradually, and ease up on training when needed    Have an expert check your gait for running or other sporting activities    Stretch properly before and after exercise    Replace your running shoes regularly    Lose excess weight     When to call your healthcare provider  Call your healthcare provider right away if:    Your symptoms dont get better after a few weeks of treatment    You have any new symptoms   Date Last Reviewed: 4/1/2017 2000-2017 [employer]. [address] [place] [address]. All rights reserved. This information is not intended as a substitute for professional medical care. Always follow your healthcare professional's instructions.

## 2021-06-17 NOTE — PATIENT INSTRUCTIONS - HE
Patient Instructions by Telly Rod PA-C at 10/10/2019  3:20 PM     Author: Telly Rod PA-C Service: -- Author Type: Physician Assistant    Filed: 10/10/2019  5:01 PM Encounter Date: 10/10/2019 Status: Addendum    : Telly Rod PA-C (Physician Assistant)    Related Notes: Original Note by Telly Rod PA-C (Physician Assistant) filed at 10/10/2019  5:00 PM       You were seen today for acute bronchitis.     Symptom management:  - Get plenty of rest  - Avoid smoking and second hand smoke  - May take tylenol or ibuprofen for fever/discomfort  - Drink plenty of non-caffeinated fluids  - Use nasal steroid spray for sinus congestion  - Albuterol inhaler may be used every 6 hours as needed for chest tightness      Reasons to be seen in the emergency room:  - Develop a fever of 100.4 or higher  - Cough changes, coughing up blood, or become short of breath  - Neck stiffness  - Chest pain  - Severe headache  - Unable to tolerate eating or drinking fluids    Otherwise, if no symptom improvement after 5 days, follow-up with your primary care provider.        Patient Education     Bronchitis, Antibiotic Treatment (Adult)    Bronchitis is an infection of the air passages (bronchial tubes) in your lungs. It often occurs when you have a cold. This illness is contagious during the first few days and is spread through the air by coughing and sneezing, or by direct contact (touching the sick person and then touching your own eyes, nose, or mouth).  Symptoms of bronchitis include cough with mucus (phlegm) and low-grade fever. Bronchitis usually lasts 7 to 14 days. Mild cases can be treated with simple home remedies. More severe infection is treated with an antibiotic.  Home care  Follow these guidelines when caring for yourself at home:    If your symptoms are severe, rest at home for the first 2 to 3 days. When you go back to your usual activities, don't let yourself get too tired.    Do not smoke. Also avoid being  exposed to secondhand smoke.    You may use over-the-counter medicines to control fever or pain, unless another medicine was prescribed. If you have chronic liver or kidney disease or have ever had a stomach ulcer or gastrointestinal bleeding, talk with your healthcare provider before using these medicines. Also talk to your provider if you are taking medicine to prevent blood clots. Aspirin should never be given to anyone younger than 18 years of age who is ill with a viral infection or fever. It may cause severe liver or brain damage.    Your appetite may be poor, so a light diet is fine. Avoid dehydration by drinking 6 to 8 glasses of fluids per day (such as water, soft drinks, sports drinks, juices, tea, or soup). Extra fluids will help loosen secretions in the nose and lungs.    Over-the-counter cough, cold, and sore-throat medicines will not shorten the length of the illness, but they may be helpful to reduce symptoms. (Note: Do not use decongestants if you have high blood pressure.)    Finish all antibiotic medicine. Do this even if you are feeling better after only a few days.  Follow-up care  Follow up with your healthcare provider, or as advised. If you had an X-ray or ECG (electrocardiogram), a specialist will review it. You will be notified of any new findings that may affect your care.  If you are age 65 or older, or if you have a chronic lung disease or condition that affects your immune system, or you smoke, ask your healthcare provider about getting a pneumococcal vaccine and a yearly flu shot (influenza vaccine).  When to seek medical advice  Call your healthcare provider right away if any of these occur:    Fever of 100.4 F (38 C) or higher, or as directed by your healthcare provider    Coughing up increased amounts of colored sputum    Weakness, drowsiness, headache, facial pain, ear pain, or a stiff neck  Call 911  Call 911 if any of these occur.    Coughing up blood    Worsening weakness,  drowsiness, headache, or stiff neck    Trouble breathing, wheezing, or pain with breathing  Date Last Reviewed: 9/13/2015 2000-2017 [employer]. [address] [place] [address], [place] [address]. All rights reserved. This information is not intended as a substitute for professional medical care. Always follow your healthcare professional's instructions.           Patient Education     Discharge Instructions for Hypokalemia  You have been diagnosed with hypokalemia. This means you have a low level of potassium in your blood. Potassium helps your nerve and muscle cells work as they should. These cells include the cells in your heart. A low level of potassium in the blood can cause serious problems, such as abnormal heart rhythms and even heart attack.  Diet changes  Eat more potassium-rich foods:    Bananas    Oranges and orange juice    Tomatoes, tomato sauce, and tomato juice    Leafy green vegetables, such as spinach, kale, salad greens, collards, and chard    Melons (all kinds)    Pomegranates    Peas    Beans    Potatoes    Sweet potatoes    Avocados, including guacamole    Vegetable juices, such as V8    Fruit juices    All nuts and seeds    Fish, including tuna, halibut, salmon, cod, snapper, yusef, swordfish, and perch    Milk, including fat-free, low-fat, whole, chocolate, and buttermilk    Soy milk  Other home care    Take a potassium supplement as directed by your healthcare provider.    After strenuous exercise or any activity that causes you to sweat a lot, grab a beverage high in potassium. This includes chocolate milk, coconut water, orange juice, or low-sodium vegetable juices.    Be sure to eat foods or drink fluids that contain potassium if you are having diarrhea or vomiting.    Have your potassium levels checked regularly.    Take all medicines exactly as directed.    Tell your healthcare provider about all prescription and over-the-counter medicines you are taking. This includes herbal  products.    Avoid foods that are high in salt. Avoid canned and prepared foods that are high in salt.  Follow-up    Make a follow-up appointment as directed by our staff.    Keep all follow-up appointments. Your healthcare provider needs to monitor your condition closely.     When to call your healthcare provider  Call your provider right away or go to the emergency room if you have any of the following:    Vomiting    Fatigue    Diarrhea    Rapid, irregular heartbeat    Shortness of breath    Chest pain    Muscle cramps, spasms, or twitching    Weakness    Paralysis   Date Last Reviewed: 6/1/2017 2000-2019 [employer]. [address] [place] [id number]. All rights reserved. This information is not intended as a substitute for professional medical care. Always follow your healthcare professional's instructions.

## 2021-06-17 NOTE — PROGRESS NOTES
"Assessment/Plan:     1. Routine health maintenance    2. Screening for lipid disorders  - Lipid Cascade RANDOM    3. RLQ abdominal pain  No tenderness on exam today.  I suspect constipation, as previously discussed.  Will xray the abdomen today to assess for retained stool.  Encouraged increased water intake, fruits, vegetables, and fiber.   - XR Abdomen Flat and Upright; Future        Subjective:   Van Degroot  is a 23 y.o. male who presents for an annual exam.      Random glucose checked in February was normal.  Significant family history of diabetes.      Patient continues to complain of intermittent RLQ abdominal pain.  He is not experiencing any pain today.  Describes pain as a \"sharp\" pain that comes and goes.  No fever or N/V/D.  He will sometimes feel the pain more when he urinates or has a stool.  Patient works as a  and does not stop frequently to void.  He tells me that he does not drink any water.  He does not feel constipated.  No blood in his stools or rectal pain.     Healthy Habits:   Regular Exercise: yes  Sunscreen Use: no  Healthy Diet: yes  Dental Visits Regularly: yes  Seat Belt: yes  Sexually active: yes  Hemoccults: na  Flex Sig: na  Colonoscopy: na  Lipid Profile: na  Glucose Screen: 2017 - normal    Immunization History   Administered Date(s) Administered     Hep B, Adult 10/31/2012     Hep B, historic 10/31/2012     Influenza, inj, historic,unspecified 10/31/2012     MMR 10/31/2012, 01/10/2014, 02/10/2014     Meningococcal MCV4P 10/31/2012     Meningococcal,ACWY,Unspecified Formulation 10/31/2012     Td, Adult, Absorbed 10/31/2012     Td, adult adsorbed, PF 10/31/2012, 11/06/2014     Tdap 02/10/2014     Varicella 10/31/2012       Immunization status: up to date and documented.    No current outpatient prescriptions on file.     No current facility-administered medications for this visit.        History reviewed. No pertinent past medical history.  Past Surgical History: " "  Procedure Laterality Date     INCISION AND DRAINAGE PERIRECTAL ABSCESS        No Known Allergies  Family History   Problem Relation Age of Onset     Diabetes Mother      Diabetes Father      Diabetes Sister      Diabetes Brother      Diabetes Maternal Grandmother      Diabetes Maternal Grandfather      Diabetes Paternal Grandmother      Diabetes Paternal Grandfather      Social History     Social History     Marital status: Single     Spouse name: N/A     Number of children: N/A     Years of education: N/A     Occupational History     Not on file.     Social History Main Topics     Smoking status: Never Smoker     Smokeless tobacco: Never Used     Alcohol use No     Drug use: No     Sexual activity: No     Other Topics Concern     Not on file     Social History Narrative        Review of Systems  Fourteen point review of systems negative except as mentioned in HPI    Objective:      Vitals:    05/04/18 1602   BP: 120/84   Patient Site: Right Arm   Patient Position: Sitting   Cuff Size: Adult Large   Pulse: 68   Weight: 216 lb 3.2 oz (98.1 kg)   Height: 6' 0.5\" (1.842 m)     Body mass index is 28.92 kg/(m^2).    Physical Exam:  General Appearance: Alert, cooperative, no distress, appears stated age  Head: Normocephalic, without obvious abnormality, atraumatic  Eyes: PERRL, conjunctiva/corneas clear, EOM's intact  Ears: Normal TM's and external ear canals, both ears  Nose: Nares normal, septum midline,mucosa normal, no drainage  Throat: Lips, mucosa, and tongue normal; teeth and gums normal  Neck: Supple, symmetrical, trachea midline, no adenopathy;  thyroid: not enlarged, symmetric, no tenderness/mass/nodules; no carotid bruit or JVD  Back: no CVA tenderness  Lungs: Clear to auscultation bilaterally, respirations unlabored  Heart: Regular rate and rhythm, S1 and S2 normal, no murmur, rub, or gallop   Abdomen: Soft, non-tender, bowel sounds active all four quadrants,  no masses, no organomegaly  Extremities: " Extremities normal, atraumatic, no cyanosis or edema  Skin: Skin color, texture, turgor normal, no rashes or lesions  Neurologic: Normal   Psychologic: appropriate affective, answers all of my questions appropriately. No hallucinations, delusion, or suicidal ideations.    Amalia Ambrosio, JULIÁN-C

## 2021-06-18 NOTE — PROGRESS NOTES
Assessment/Plan:   Red spots on roof of mouth/palatal petechiae  Strep pharyngitis  Allergic rhinitis  URI vs allergic rhinitis for a month, improving without definite sinusitis.  ST and palatal petechiae x 1 day - RST positive. Puffy feet likely related to venous stasis and the recent heat wave. No signs for DVT. Will treat with higher dose amoxicillin to cover for possible secondary sinusitis as well as the strep.   - Rapid Strep A Screen-Throat  - amoxicillin (AMOXIL) 875 MG tablet; Take 1 tablet (875 mg total) by mouth 2 (two) times a day for 10 days. Take with food.  Dispense: 20 tablet; Refill: 0  - fluticasone (FLONASE) 50 mcg/actuation nasal spray; 1 spray each nostril once or twice daily.  Dispense: 18 g; Refill: 2    Amoxicillin twice a day for 10 days   Change toothbrush in 2-3 days  Contagious for 24 hours  Start Fluticasone nasal spray once or twice daily for possible allergy to dust  Follow up as needed.     Subjective:      Van Degroot is a 23 y.o. male who presents with sore throat.  She has been bothered by allergies for about a month.  She has some dust allergy always but the last month there has been more itchy nose, eyes and ears, more congestion.  She thought it was getting better this week and then yesterday developed severe sore throat and fatigue.  Some blood streaks in the mucus when she blows her nose. She looked at her throat and noticed red spots on the roof of her mouth.  No fever. No cough. She has mild HA and swollen glands.  She has also noticed her feet more swollen this week although it has been very hot.  No sinus pain or pressure. No N/V/D, no abdominal pain, no CP or shortness of breath. She has minimal actual leg swelling, mainly feet and no calf tenderness. No rash. NKDA  She does not take medications for her allergies as they are usually not too troublesome.     No current outpatient prescriptions on file prior to visit.     No current facility-administered medications on  file prior to visit.      Patient Active Problem List   Diagnosis     Right shoulder strain, initial encounter     Vitamin D deficiency     Perianal fistula     Elevated alanine aminotransferase (ALT) level     Chronic right shoulder pain     Annual physical exam       Objective:     /70  Pulse 66  Temp 97  F (36.1  C) (Oral)   Resp 15  Wt 215 lb (97.5 kg)  SpO2 99%  BMI 28.76 kg/m2    Physical  General Appearance: Alert, cooperative, no distress  Head: Normocephalic, without obvious abnormality, atraumatic  Eyes: Conjunctivae are normal.  Ears: Normal TMs and external ear canals, both ears  Nose: mild congestion, scab on septum.  No sinus pain with percussion.   Throat: Throat is red with generous tonsils, no exudate. There are palatal petechiae. Uvula midline.  No vesicular  lesions  Neck: tender anterior adenopathy  Lungs: Clear to auscultation bilaterally, respirations unlabored  Heart: Regular rate and rhythm  Extremities: No lower extremity pitting pretibial edema but both feet are a little puffy on the dorsum, normal cap refill and sensation. Normal movement and strength of feet and ankles. Pedal pulses are palpable.  No venous cords palpable in the calves.  Negative jade's.   Skin:  no rashes or lesions  Psychiatric: Patient has a normal mood and affect.        Recent Results (from the past 24 hour(s))   Rapid Strep A Screen-Throat   Result Value Ref Range    Rapid Strep A Antigen Group A Strep detected (!) No Group A Strep detected, presumptive negative

## 2021-06-20 NOTE — PROGRESS NOTES
Assessment/Plan:     1. RLQ abdominal pain  This is a chronic issue, however currently resolved.  In the past, patient has had relief with regular bowel movements.  We discussed fluids, activity, and a high-fiber diet.  Discussed symptoms that would warrant follow-up including fever, worsening pain, or nausea/vomiting.  Consider CT scan if symptoms continue.    2. Heel pain, bilateral  High suspicion for plantar fasciitis.  I have discussed this previously with the patient and referred him for physical therapy.  Encouraged wearing supportive shoes both inside and outside of the house.  Discussed daily stretching, anti-inflammatories, and icing the feet.  I did place a podiatry referral for him to use as needed.  - Ambulatory referral to Podiatry        Subjective:     Van Degroot is a 23 y.o. male who presents for ED follow-up.  Patient was in the ER 2 days ago with complaints of fatigue, generalized body aches, diarrhea, and right lower quadrant pain.  She was given a bolus.  Labs are completed, and were normal.  He was diagnosed with a viral illness and sent home.  Patient tells me his right lower quadrant abdominal pain has resolved.  It is better when he pushes on the area, he urinates, or has a bowel movement.  He currently denies any fever, N/V/D, or constipation.  His energy has slowly gotten better and he is no longer experiencing any joint pain.  Patient's appetite is normal.  He continues to work as a , and is in the car the majority of the day.    Complains of bilateral heel pain that is worse first thing in the morning.  It gets better throughout the day.  We have discussed this in the past.  He did not attend physical therapy as I referred him to.  He is not doing any stretches at home.      The following portions of the patient's history were reviewed and updated as appropriate: allergies, current medications, past family history, past medical history, past social history, past surgical  history and problem list.    Review of Systems  A comprehensive review of systems was performed and was otherwise negative    Objective:     /78 (Patient Site: Right Arm, Patient Position: Sitting, Cuff Size: Adult Regular)  Pulse (!) 57  Wt 216 lb 11.2 oz (98.3 kg)  BMI 28.99 kg/m2    General Appearance: Alert, cooperative, no distress, appears stated age  Ears: Normal TM's and external ear canals, both ears  Throat: Lips, mucosa, and tongue normal; teeth and gums normal  Neck: Supple, symmetrical, trachea midline, no adenopathy  Back: no CVA tenderness  Lungs: Clear to auscultation bilaterally, respirations unlabored  Heart: Regular rate and rhythm, S1 and S2 normal, no murmur, rub, or gallop   Abdomen: Soft, non-tender, bowel sounds active all four quadrants,  no masses, no organomegaly    Amalia Ambrosio NP-C

## 2021-06-22 NOTE — PROGRESS NOTES
Assessment:     1. Atypical chest pain  Electrocardiogram Perform and Read   2. RLQ abdominal pain  XR Abdomen Flat and Upright    CT Abdomen Pelvis With Oral With IV Contrast    CANCELED: CT Abdomen With Oral With IV Contrast     Xr Abdomen Flat And Upright    Result Date: 12/28/2018  XR ABDOMEN FLAT AND UPRIGHT 12/28/2018 3:33 PM INDICATION: Abdominal pain, R/o obstruction or constipation COMPARISON: Abdomen x-ray 05/04/2018 FINDINGS: Nonobstructive bowel gas pattern. No definite evidence of free air. No significant air-fluid levels. Mild fecal retention within the nondistended colon. No definite renal stone.    Ct Abdomen Pelvis With Oral With Iv Contrast    Result Date: 12/28/2018  CT ABDOMEN PELVIS W ORAL W IV CONTRAST 12/28/2018 6:46 PM     INDICATION: Right lower quadrant abdominal pain TECHNIQUE: CT abdomen and pelvis. Multiplanar reformation images (MPR). Dose reduction techniques were used. IV CONTRAST: Iohexol (Omni) 100 mL COMPARISON: None. FINDINGS: LUNG BASES: Negative. ABDOMEN: Liver, gallbladder, spleen, adrenal glands, kidneys and pancreas unremarkable. No aneurysm. No adenopathy. PELVIS: Tiny fat-containing paraumbilical hernia without bowel obstruction. 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 Normal retrocecal appendix. MUSCULOSKELETAL: Negative.     CONCLUSION: 1.  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 No bowel obstruction. No finding to account for patient's pain.       Plan:     Differential diagnosis include but not limited to right lower quadrant abdominal pain, constipation, or appendicitis. Xray done to r/o constipation or obstruction.  Negative, discussed results with the patient.  Since he has been complaining of RLQ pain, this is not acute but I will order CT scan to r/o other abnormalities.  CT scan done, discussed results with the patient, no immediate attention needed.  Advised patient to try Omeprazole daily to see if that will relieve his symptoms.  May  use tylenol for pain or discomfort.      I discussed red flag symptoms, return precautions to clinic/ER and follow up care with patient/parent.  Expected clinical course, symptomatic cares advised. Questions answered. Patient/parent amenable with plan.     Subjective:       23 y.o. male presents for evaluation of possible heartburn.  Patient reports that he has been having symptoms for about 1 year he, also he has been having dry mouth.  Patient has history of chronic abdominal pain with constipation.  Today he reports his pain is very painful.  This started yesterday.  With the heartburn he feels like he is having shortness of breath, he also has a headache.  The pain comes and goes.  He denies having any fever, no nausea, vomiting, or diarrhea.  His last bowel movement was yesterday therefore he does not think he has constipation.  He admits that the chest discomfort does not like her heart attack.  I reviewed family history, no family history of cardiac diseases, has family hx of diabetes.  Currently patient has no medical condition and not taking any prescribed medications.        The following portions of the patient's history were reviewed and updated as appropriate: allergies, current medications, past family history, past medical history, past social history, past surgical history and problem list.    Review of Systems  A 12 point comprehensive review of systems was negative except as noted.      Objective:      /64   Pulse 84   Temp 98  F (36.7  C) (Oral)   Resp 18   Wt 213 lb (96.6 kg)   SpO2 98%   BMI 28.49 kg/m    General appearance: alert, appears stated age, cooperative and mild distress  Head: Normocephalic, without obvious abnormality, atraumatic, sinuses nontender to percussion  Eyes: conjunctivae/corneas clear. PERRL, EOM's intact. Fundi benign.  Ears: abnormal TM right ear - bulging and air-fluid level and abnormal TM left ear - bulging and air-fluid level  Nose: Nares normal. Septum  midline. Mucosa normal. No drainage or sinus tenderness., no discharge, moderate congestion  Throat: lips, mucosa, and tongue normal; teeth and gums normal  Lungs: clear to auscultation bilaterally  Heart: regular rate and rhythm, S1, S2 normal, no murmur, click, rub or gallop  Abdomen: abnormal findings:  moderate tenderness in the periumbilical area and and RLQ  Extremities: extremities normal, atraumatic, no cyanosis or edema  Pulses: 2+ and symmetric  Skin: Skin color, texture, turgor normal. No rashes or lesions  Lymph nodes: Cervical, supraclavicular, and axillary nodes normal.  Neurologic: Grossly normal     This note has been dictated using voice recognition software. Any grammatical or context distortions are unintentional and inherent to the software

## 2021-06-28 NOTE — PROGRESS NOTES
Progress Notes by Telly Rod PA-C at 10/10/2019  3:20 PM     Author: Telly Rod PA-C Service: -- Author Type: Physician Assistant    Filed: 10/10/2019  6:43 PM Encounter Date: 10/10/2019 Status: Signed    : Telly Rod PA-C (Physician Assistant)       Subjective:      Patient ID: Van Degroot is a 24 y.o. male.    Chief Complaint:    HPI     Van Degroot is a 24 y.o. male who presents today complaining of a 2-day history of cough that is dry nonproductive.  He is not stating that he has tachypnea, tachycardia or pleuritic chest pain.  He did say that he had some shortness of breath associated with the cough.  He said no overt syncope or presyncope, diaphoresis nausea vomiting fever chills or night sweats.  He is a non-smoker and is not exposed to secondhand smoke.     No past medical history on file.    Past Surgical History:   Procedure Laterality Date   ? INCISION AND DRAINAGE PERIRECTAL ABSCESS         Family History   Problem Relation Age of Onset   ? Diabetes Mother    ? Diabetes Father    ? Diabetes Sister    ? Diabetes Brother    ? Diabetes Maternal Grandmother    ? Diabetes Maternal Grandfather    ? Diabetes Paternal Grandmother    ? Diabetes Paternal Grandfather        Social History     Tobacco Use   ? Smoking status: Never Smoker   ? Smokeless tobacco: Never Used   Substance Use Topics   ? Alcohol use: No   ? Drug use: No       Review of Systems  As above in HPI, otherwise balance of Review of Systems are negative.    Objective:     /74 (Patient Site: Right Arm, Patient Position: Sitting, Cuff Size: Adult Regular)   Pulse 76   Temp 98.2  F (36.8  C) (Oral)   Resp 18   Wt 216 lb 4.8 oz (98.1 kg)   SpO2 100%   BMI 28.93 kg/m      Physical Exam  General: Patient is resting comfortably no acute distress is afebrile  HEENT: Head is normocephalic atraumatic   eyes are PERRL EOMI sclera anicteric   TMs are clear bilaterally  Throat is clear and no exudate  No cervical  lymphadenopathy present  LUNGS: Clear to auscultation bilaterally  HEART: Regular rate and rhythm  Skin: Without rash non-diaphoretic    Lab:  Recent Results (from the past 24 hour(s))   Urinalysis-UC if Indicated   Result Value Ref Range    Color, UA Yellow Colorless, Yellow, Straw, Light Yellow    Clarity, UA Clear Clear    Glucose, UA Negative Negative    Bilirubin, UA Negative Negative    Ketones, UA Negative Negative    Specific Gravity, UA 1.020 1.005 - 1.030    Blood, UA Negative Negative    pH, UA 7.0 5.0 - 8.0    Protein, UA Negative Negative mg/dL    Urobilinogen, UA 0.2 E.U./dL 0.2 E.U./dL, 1.0 E.U./dL    Nitrite, UA Negative Negative    Leukocytes, UA Negative Negative   ISTAT CHEM 7 ([facility] CLIN[facility] ONLY)   Result Value Ref Range    Sodium 139 136 - 145 mmol/L    Potassium 3.4 (L) 3.5 - 5.5 mmol/L    CO2 26 22 - 31 mmol/L    Chloride 101 98 - 107 mmol/L    Anion Gap, Calculation 12 5 - 18 mmol/L    Glucose 139 (H) 70 - 125 mg/dL    BUN 5 (L) 8 - 22 mg/dL    Creatinine 0.70 (L) 0.80 - 1.50 mg/dL   HM1 (CBC with Diff)   Result Value Ref Range    WBC 5.3 4.0 - 11.0 thou/uL    RBC 5.13 4.40 - 6.20 mill/uL    Hemoglobin 15.3 14.0 - 18.0 g/dL    Hematocrit 44.5 40.0 - 54.0 %    MCV 87 80 - 100 fL    MCH 29.9 27.0 - 34.0 pg    MCHC 34.4 32.0 - 36.0 g/dL    RDW 13.1 11.0 - 14.5 %    Platelets 189 140 - 440 thou/uL    MPV 9.6 7.0 - 10.0 fL    Neutrophils % 55 50 - 70 %    Lymphocytes % 33 20 - 40 %    Monocytes % 9 2 - 10 %    Eosinophils % 3 0 - 6 %    Basophils % 0 0 - 2 %    Neutrophils Absolute 2.9 2.0 - 7.7 thou/uL    Lymphocytes Absolute 1.8 0.8 - 4.4 thou/uL    Monocytes Absolute 0.5 0.0 - 0.9 thou/uL    Eosinophils Absolute 0.2 0.0 - 0.4 thou/uL    Basophils Absolute 0.0 0.0 - 0.2 thou/uL     Imaging    Xr Chest 2 Views    Result Date: 10/10/2019  EXAM: XR CHEST 2 VIEWS LOCATION: [facility] DATE/TIME: 10/10/2019 4:34 PM INDICATION: Cough and chest congestion on the left side. COMPARISON: 12.10.2014.      Negative chest.       I personally reviewed x-rays and did not see any acute cardiopulmonary processes.    Assessment:     Procedures    The primary encounter diagnosis was Cough. Diagnoses of Hypokalemia and Shortness of breath were also pertinent to this visit.    Plan:     1. Cough  HM1(CBC and Differential)    Urinalysis-UC if Indicated    ISTAT CHEM 7 ([facility] CLIN[facility] ONLY)    XR Chest 2 Views    HM1 (CBC with Diff)    JIC GRN    doxycycline (MONODOX) 100 MG capsule   2. Hypokalemia     3. Shortness of breath  HM1(CBC and Differential)    Urinalysis-UC if Indicated    ISTAT CHEM 7 ([facility] CLIN[facility] ONLY)    XR Chest 2 Views    HM1 (CBC with Diff)    JIC GRN       Reassured the patient that there were no findings on the CBC in the chest x-ray were clear.  We will treat him with a respiratory antibiotic for cough and he had low level of potassium at the borderline normal.  I will have him eat potassium rich foods and use over-the-counter potassium supplement.  He will close follow-up with his primary care provider to repeat the potassium level and reevaluate his symptoms.  Indication for return to the clinic or to ER gone over questions were answered patient satisfaction before discharge.    Patient Instructions     You were seen today for acute bronchitis.     Symptom management:  - Get plenty of rest  - Avoid smoking and second hand smoke  - May take tylenol or ibuprofen for fever/discomfort  - Drink plenty of non-caffeinated fluids  - Use nasal steroid spray for sinus congestion  - Albuterol inhaler may be used every 6 hours as needed for chest tightness      Reasons to be seen in the emergency room:  - Develop a fever of 100.4 or higher  - Cough changes, coughing up blood, or become short of breath  - Neck stiffness  - Chest pain  - Severe headache  - Unable to tolerate eating or drinking fluids    Otherwise, if no symptom improvement after 5 days, follow-up with your primary care provider.        Patient Education      Bronchitis, Antibiotic Treatment (Adult)    Bronchitis is an infection of the air passages (bronchial tubes) in your lungs. It often occurs when you have a cold. This illness is contagious during the first few days and is spread through the air by coughing and sneezing, or by direct contact (touching the sick person and then touching your own eyes, nose, or mouth).  Symptoms of bronchitis include cough with mucus (phlegm) and low-grade fever. Bronchitis usually lasts 7 to 14 days. Mild cases can be treated with simple home remedies. More severe infection is treated with an antibiotic.  Home care  Follow these guidelines when caring for yourself at home:    If your symptoms are severe, rest at home for the first 2 to 3 days. When you go back to your usual activities, don't let yourself get too tired.    Do not smoke. Also avoid being exposed to secondhand smoke.    You may use over-the-counter medicines to control fever or pain, unless another medicine was prescribed. If you have chronic liver or kidney disease or have ever had a stomach ulcer or gastrointestinal bleeding, talk with your healthcare provider before using these medicines. Also talk to your provider if you are taking medicine to prevent blood clots. Aspirin should never be given to anyone younger than 18 years of age who is ill with a viral infection or fever. It may cause severe liver or brain damage.    Your appetite may be poor, so a light diet is fine. Avoid dehydration by drinking 6 to 8 glasses of fluids per day (such as water, soft drinks, sports drinks, juices, tea, or soup). Extra fluids will help loosen secretions in the nose and lungs.    Over-the-counter cough, cold, and sore-throat medicines will not shorten the length of the illness, but they may be helpful to reduce symptoms. (Note: Do not use decongestants if you have high blood pressure.)    Finish all antibiotic medicine. Do this even if you are feeling better after only a few  days.  Follow-up care  Follow up with your healthcare provider, or as advised. If you had an X-ray or ECG (electrocardiogram), a specialist will review it. You will be notified of any new findings that may affect your care.  If you are age 65 or older, or if you have a chronic lung disease or condition that affects your immune system, or you smoke, ask your healthcare provider about getting a pneumococcal vaccine and a yearly flu shot (influenza vaccine).  When to seek medical advice  Call your healthcare provider right away if any of these occur:    Fever of 100.4 F (38 C) or higher, or as directed by your healthcare provider    Coughing up increased amounts of colored sputum    Weakness, drowsiness, headache, facial pain, ear pain, or a stiff neck  Call 911  Call 911 if any of these occur.    Coughing up blood    Worsening weakness, drowsiness, headache, or stiff neck    Trouble breathing, wheezing, or pain with breathing  Date Last Reviewed: 9/13/2015 2000-2017 [employer]. [address] [place] [address]. All rights reserved. This information is not intended as a substitute for professional medical care. Always follow your healthcare professional's instructions.           Patient Education     Discharge Instructions for Hypokalemia  You have been diagnosed with hypokalemia. This means you have a low level of potassium in your blood. Potassium helps your nerve and muscle cells work as they should. These cells include the cells in your heart. A low level of potassium in the blood can cause serious problems, such as abnormal heart rhythms and even heart attack.  Diet changes  Eat more potassium-rich foods:    Bananas    Oranges and orange juice    Tomatoes, tomato sauce, and tomato juice    Leafy green vegetables, such as spinach, kale, salad greens, collards, and chard    Melons (all kinds)    Pomegranates    Peas    Beans    Potatoes    Sweet potatoes    Avocados, including  guacamole    Vegetable juices, such as V8    Fruit juices    All nuts and seeds    Fish, including tuna, halibut, salmon, cod, snapper, yusef, swordfish, and perch    Milk, including fat-free, low-fat, whole, chocolate, and buttermilk    Soy milk  Other home care    Take a potassium supplement as directed by your healthcare provider.    After strenuous exercise or any activity that causes you to sweat a lot, grab a beverage high in potassium. This includes chocolate milk, coconut water, orange juice, or low-sodium vegetable juices.    Be sure to eat foods or drink fluids that contain potassium if you are having diarrhea or vomiting.    Have your potassium levels checked regularly.    Take all medicines exactly as directed.    Tell your healthcare provider about all prescription and over-the-counter medicines you are taking. This includes herbal products.    Avoid foods that are high in salt. Avoid canned and prepared foods that are high in salt.  Follow-up    Make a follow-up appointment as directed by our staff.    Keep all follow-up appointments. Your healthcare provider needs to monitor your condition closely.     When to call your healthcare provider  Call your provider right away or go to the emergency room if you have any of the following:    Vomiting    Fatigue    Diarrhea    Rapid, irregular heartbeat    Shortness of breath    Chest pain    Muscle cramps, spasms, or twitching    Weakness    Paralysis   Date Last Reviewed: 6/1/2017 2000-2019 [employer]. [address] [place] [address], [place] [address]. All rights reserved. This information is not intended as a substitute for professional medical care. Always follow your healthcare professional's instructions.

## 2021-07-04 NOTE — ADDENDUM NOTE
Addendum Note by Amalia Ambrosio NP at 3/30/2021 11:29 AM     Author: Amalia Ambrosio NP Service: -- Author Type: Nurse Practitioner    Filed: 3/30/2021 11:29 AM Encounter Date: 3/30/2021 Status: Signed    : Amalia Ambrosio NP (Nurse Practitioner)    Addended by: AMALIA AMBROSIO on: 3/30/2021 11:29 AM        Modules accepted: Orders

## 2021-07-27 ENCOUNTER — OFFICE VISIT (OUTPATIENT)
Dept: FAMILY MEDICINE | Facility: CLINIC | Age: 26
End: 2021-07-27
Payer: COMMERCIAL

## 2021-07-27 VITALS
DIASTOLIC BLOOD PRESSURE: 75 MMHG | BODY MASS INDEX: 28.75 KG/M2 | WEIGHT: 214.9 LBS | HEART RATE: 71 BPM | SYSTOLIC BLOOD PRESSURE: 114 MMHG

## 2021-07-27 DIAGNOSIS — H02.9 EYELID SYMPTOM: ICD-10-CM

## 2021-07-27 DIAGNOSIS — M89.8X1 COLLAR BONE PAIN: Primary | ICD-10-CM

## 2021-07-27 PROCEDURE — 99213 OFFICE O/P EST LOW 20 MIN: CPT | Performed by: NURSE PRACTITIONER

## 2021-07-27 NOTE — PROGRESS NOTES
"    Assessment & Plan     Collar bone pain  No trauma or injury that I would recommend imaging at this time.  Suspect a strain secondary to overuse.  Recommend rest, ice/heat, and a trial of NSAIDs.  May also consider some PT in the future if not improving.     Eyelid symptom  The abnormality seems to be associated with a vessel.  Reassured patient.  Follow up with any pain, change in vision, eye redness, or drainage.       Return in about 4 weeks (around 8/24/2021) for with any new or worsening symptoms.    Amalai Ambrosio NP  [facility]    Elliot Atwood is a 26 year old who presents with complaints of right collarbone pain.  Symptoms started about 2 weeks ago after he was on a long road trip.  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  There was no injury or trauma that he can recall.  Reports a constant pain that is getting worse.  He has a hard time sleeping on his right side.  Of note, patient has some chronic right shoulder pain.  He does not feel that this is related at all.  He has not noticed any swelling or redness.  No masses in that area.  Denies any weakness or numbness/tingling in the right arm.  No over-the-counter treatments tried.    Patient also notes a \"line\" to his left upper eyelid.  When he closes his eye, he feels a vertical line.  Denies any change in vision, pain, eye itching/redness/drainage.  No swelling to the eyelid.  He states he can see an abnormality when he flips his eyelid.    Review of Systems   Pertinent items in HPI      Objective    /75   Pulse 71   Wt 97.5 kg (214 lb 14.4 oz)   BMI 28.75 kg/m    Body mass index is 28.75 kg/m .  Physical Exam   GENERAL: healthy, alert and no distress  EYES: Eyes grossly normal to inspection, PERRL and conjunctivae and sclerae normal. There is what appears to be a vein on the underside of the left upper eyelid that corresponds with the area of concern.   MS: no gross deformity, " swelling, or redness to the right collarbone. No tenderness palpated. Normal right shoulder ROM.

## 2021-12-08 ENCOUNTER — VIRTUAL VISIT (OUTPATIENT)
Dept: FAMILY MEDICINE | Facility: CLINIC | Age: 26
End: 2021-12-08
Payer: COMMERCIAL

## 2021-12-08 DIAGNOSIS — U07.1 INFECTION DUE TO 2019 NOVEL CORONAVIRUS: Primary | ICD-10-CM

## 2021-12-08 PROCEDURE — 99441 PR PHYSICIAN TELEPHONE EVALUATION 5-10 MIN: CPT | Mod: 95 | Performed by: STUDENT IN AN ORGANIZED HEALTH CARE EDUCATION/TRAINING PROGRAM

## 2021-12-08 RX ORDER — ALBUTEROL SULFATE 90 UG/1
2 AEROSOL, METERED RESPIRATORY (INHALATION) EVERY 6 HOURS
Qty: 18 G | Refills: 0 | Status: SHIPPED | OUTPATIENT
Start: 2021-12-08

## 2021-12-08 NOTE — PROGRESS NOTES
Rai is a 26 year old who is being evaluated via a billable telephone visit.      What phone number would you like to be contacted at? [phone]  How would you like to obtain your AVS? Mail a copy    Assessment & Plan     26-year-old male who presents via phone visit with symptoms of chest congestion as he continues to recover from Covid infection diagnosed with positive test on 11/29/2021.  I discussed that as outpatient we only do symptomatic management but that if he wanted to pursue treatment he could enter the lottery for monoclonal antibodies to the [facility].  Symptomatic treatment I discussed using Vicks and a humidifier as well as considering something like Sudafed.  Offered albuterol inhaler to use as needed and he would like to try this.  I discussed that if his symptoms continue to worsen and he has more difficulty breathing he needs to go to the ER.  As I was explaining this patient abruptly hung up on me and ended the visit.    1. Infection due to 2019 novel coronavirus  - albuterol (PROAIR HFA/PROVENTIL HFA/VENTOLIN HFA) 108 (90 Base) MCG/ACT inhaler; Inhale 2 puffs into the lungs every 6 hours  Dispense: 18 g; Refill: 0    Jose Miguel Lancaster MD  [facility]    Elliot Atwood is a 26 year old who presents for the following health issues    Chief Complaint   Patient presents with     Sinus Problem     symptoms going of for ten days. coughing, congestion, chest congestion.      Start time: 5:23 PM    HPI: Patient diagnosed with Covid on 11/29/2021.  He originally had symptoms of fever, headache, nasal congestion.  Most of these have cleared up but now he notes he is much more chest congestion and is coughing occasionally.  He is wondering what he can do about this.          Objective           Vitals:  No vitals were obtained today due to virtual visit.    Physical Exam           End time: 5:32 PM    Phone call duration: 9 minutes

## 2025-07-17 ENCOUNTER — OFFICE VISIT (OUTPATIENT)
Dept: FAMILY MEDICINE | Facility: CLINIC | Age: 30
End: 2025-07-17
Payer: COMMERCIAL

## 2025-07-17 VITALS
HEART RATE: 94 BPM | BODY MASS INDEX: 28.24 KG/M2 | WEIGHT: 213.1 LBS | SYSTOLIC BLOOD PRESSURE: 152 MMHG | TEMPERATURE: 98.3 F | OXYGEN SATURATION: 99 % | RESPIRATION RATE: 18 BRPM | DIASTOLIC BLOOD PRESSURE: 76 MMHG | HEIGHT: 73 IN

## 2025-07-17 DIAGNOSIS — R53.83 OTHER FATIGUE: ICD-10-CM

## 2025-07-17 DIAGNOSIS — M25.50 POLYARTHRALGIA: Primary | ICD-10-CM

## 2025-07-17 LAB
ALBUMIN SERPL BCG-MCNC: 4.8 G/DL (ref 3.5–5.2)
ALP SERPL-CCNC: 90 U/L (ref 40–150)
ALT SERPL W P-5'-P-CCNC: 40 U/L (ref 0–70)
ANION GAP SERPL CALCULATED.3IONS-SCNC: 11 MMOL/L (ref 7–15)
AST SERPL W P-5'-P-CCNC: 27 U/L (ref 0–45)
BASOPHILS # BLD AUTO: 0.1 10E3/UL (ref 0–0.2)
BASOPHILS NFR BLD AUTO: 1 %
BILIRUB SERPL-MCNC: 0.5 MG/DL
BUN SERPL-MCNC: 10.9 MG/DL (ref 6–20)
CALCIUM SERPL-MCNC: 9.8 MG/DL (ref 8.8–10.4)
CHLORIDE SERPL-SCNC: 102 MMOL/L (ref 98–107)
CREAT SERPL-MCNC: 0.91 MG/DL (ref 0.67–1.17)
CRP SERPL-MCNC: <3 MG/L
EGFRCR SERPLBLD CKD-EPI 2021: >90 ML/MIN/1.73M2
EOSINOPHIL # BLD AUTO: 0.2 10E3/UL (ref 0–0.7)
EOSINOPHIL NFR BLD AUTO: 5 %
ERYTHROCYTE [DISTWIDTH] IN BLOOD BY AUTOMATED COUNT: 12.6 % (ref 10–15)
GLUCOSE SERPL-MCNC: 104 MG/DL (ref 70–99)
HCO3 SERPL-SCNC: 26 MMOL/L (ref 22–29)
HCT VFR BLD AUTO: 47.1 % (ref 40–53)
HGB BLD-MCNC: 16.2 G/DL (ref 13.3–17.7)
IMM GRANULOCYTES # BLD: 0 10E3/UL
IMM GRANULOCYTES NFR BLD: 1 %
LYMPHOCYTES # BLD AUTO: 1.7 10E3/UL (ref 0.8–5.3)
LYMPHOCYTES NFR BLD AUTO: 37 %
MCH RBC QN AUTO: 28.8 PG (ref 26.5–33)
MCHC RBC AUTO-ENTMCNC: 34.4 G/DL (ref 31.5–36.5)
MCV RBC AUTO: 84 FL (ref 78–100)
MONOCYTES # BLD AUTO: 0.4 10E3/UL (ref 0–1.3)
MONOCYTES NFR BLD AUTO: 8 %
NEUTROPHILS # BLD AUTO: 2.3 10E3/UL (ref 1.6–8.3)
NEUTROPHILS NFR BLD AUTO: 49 %
PLATELET # BLD AUTO: 205 10E3/UL (ref 150–450)
POTASSIUM SERPL-SCNC: 4.3 MMOL/L (ref 3.4–5.3)
PROT SERPL-MCNC: 7.8 G/DL (ref 6.4–8.3)
RBC # BLD AUTO: 5.62 10E6/UL (ref 4.4–5.9)
SODIUM SERPL-SCNC: 139 MMOL/L (ref 135–145)
TSH SERPL DL<=0.005 MIU/L-ACNC: 2.28 UIU/ML (ref 0.3–4.2)
VIT D+METAB SERPL-MCNC: 21 NG/ML (ref 20–50)
WBC # BLD AUTO: 4.7 10E3/UL (ref 4–11)

## 2025-07-17 NOTE — PROGRESS NOTES
"Assessment & Plan     Polyarthralgia  Other fatigue  Will obtain basic lab work up to assess for possible causes of patient's symptoms.  CBC has returned normal.  with the polyarthralgia and other fatigue I am suspicious for a possible autoimmune cause of symptoms.  I have obtained inflammatory markers as well as RADHA to assess for this.  Will refer to rheumatology if positive.  Assessing for Lyme as cause of symptoms as well.  [place] and I do think this is reasonable to obsessed with the fatigue and polyarthralgia.  Will check for hypothyroid.  Also concern for possible vitamin D deficiency.  He will likely need vitamin D supplementation.  - Comprehensive metabolic panel (BMP + Alb, Alk Phos, ALT, AST, Total. Bili, TP); Future  - CRP, inflammation; Future  - CBC with platelets and differential; Future  - Anti Nuclear Brittni IgG by IFA with Refle .  x; Future  - TSH with free T4 reflex; Future  - Vitamin D Deficiency; Future  - LYME DISEASE TOTAL ANTIBODIES WITH REFLEX TO CONFIRMATION; Future  - Comprehensive metabolic panel (BMP + Alb, Alk Phos, ALT, AST, Total. Bili, TP)  - CRP, inflammation  - CBC with platelets and differential  - Anti Nuclear Brittni IgG by IFA with Reflex  - TSH with free T4 reflex  - Vitamin D Deficiency  - LYME DISEASE TOTAL ANTIBODIES WITH REFLEX TO CONFIRMATION          BMI  Estimated body mass index is 28.49 kg/m  as calculated from the following:    Height as of this encounter: 1.842 m (6' 0.52\").    Weight as of this encounter: 96.7 kg (213 lb 1.6 oz).           Elliot Atwood is a 30 year old, presenting for the following health issues:  Back Pain (Weak joints, shaking when standing, decreased energy x2-3 months. Denied excess thirst or urination. States he gets around 4-5 hours of sleep a night. )      7/17/2025    11:30 AM   Additional Questions   Roomed by JOSEPH Jerez     History of Present Illness       Back Pain:  He presents for follow up of back pain. Patient's back " pain is a new problem.    Original cause of back pain: not sure  First noticed back pain: more than 1 month ago  Patient feels back pain: comes and goesLocation of back pain:  Right lower back, right middle of back and right side of waist  Description of back pain: stabbing  Back pain spreads: right knee    Since patient first noticed back pain, pain is: rapidly worsening  Does back pain interfere with his job:  Yes  On a scale of 1-10 (10 being the worst), patient describes pain as:  7  What makes back pain worse: bending, lying down, sitting and twisting   Acupuncture: not helpful  Acetaminophen: not helpful  Activity or exercise: not helpful  Chiropractor:  Not tried  Cold: not tried  Heat: not helpful  Massage: not helpful  Muscle relaxants: not helpful  NSAIDS: not tried  Opioids: not tried  Physical Therapy: not tried  Rest: not helpful  Steroid Injection: not tried  Stretching: not helpful  Surgery: not tried  TENS unit: not tried  Topical pain relievers: not tried  Other healthcare providers patient is seeing for back pain: None    Reason for visit:  Back pain weeknes more  Symptom onset:  More than a month         Right lower quadrant pain-gets better after eating. Sometimes will push all the way down on the pain and gets better after that. Patient reports normal BM-doesn't keep track.    Fatigue-worsening over the last couple of months.    Has endorsed increased allergies.    Patient denies significant snoring. Has not stopped breathing or gasping or choking while sleeping.      Reports multiple joint pain-has been ongoing for about 3 months. Has tried eating healthier and not improved. Has tried to resume more active acitivies and not improved.    Patient unsure if has had tick bite recently-patient denies removing tick from himself.                  Objective    BP (!) 152/76 (BP Location: Right arm, Patient Position: Sitting, Cuff Size: Adult Regular)   Pulse 94   Temp 98.3  F (36.8  C) (Oral)   Resp  "18   Ht 1.842 m (6' 0.52\")   Wt 96.7 kg (213 lb 1.6 oz)   SpO2 99%   BMI 28.49 kg/m    Body mass index is 28.49 kg/m .  Physical Exam  Constitutional:       Appearance: Normal appearance.   Musculoskeletal:      Comments: Musculoskeletal exam overall normal.  No significant range of motion concerns.   Neurological:      General: No focal deficit present.      Mental Status: He is alert and oriented to person, place, and time.   Psychiatric:         Mood and Affect: Mood normal.         Behavior: Behavior normal.                    Signed Electronically by: RASHAAD Degroot CNP    "

## 2025-07-21 ENCOUNTER — RESULTS FOLLOW-UP (OUTPATIENT)
Dept: FAMILY MEDICINE | Facility: CLINIC | Age: 30
End: 2025-07-21
Payer: COMMERCIAL

## 2025-07-21 NOTE — TELEPHONE ENCOUNTER
Outgoing call to patient. Relayed PCP's detailed message.     Pt states understanding, pt states will be traveling for 1-2 months and won't be able to schedule follow up appointment until after. Pt states will call back at a later time to schedule appointment with PCP.     No further questions/concerns at this time. Thank you.